# Patient Record
Sex: MALE | Race: WHITE | URBAN - METROPOLITAN AREA
[De-identification: names, ages, dates, MRNs, and addresses within clinical notes are randomized per-mention and may not be internally consistent; named-entity substitution may affect disease eponyms.]

---

## 2017-10-10 NOTE — PROGRESS NOTES
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 Tx: knee pad.     PFSH:  Past Medical, social, family histories, medications, and allergies were reviewed and updated.     OBJECTIVE:  There were no vitals taken for this visit.  General:  Rojelio is awake, alert, and cooperative.  NAD.  Knee Exam: Inspection: patellar swelling and tenderness  Tender: prepatellar bursa  Non-tender: lateral joint line, medial joint line  Active Range of Motion: full flexion, no pain with flexion, full extension, no pain with extension  Strength: quad  5/5  Special tests: normal Valgus stress test, normal Varus, negative Lachman's test, negative Diana's     Also examined: hip full range of motion     X-ray right knee: neg. pending radiology     ASSESSMENT:     ICD-10-CM    1. Right knee pain, unspecified chronicity M25.561 XR Knee Right 3 Views     SPORTS MEDICINE REFERRAL   2. Patellar bursitis of right knee M70.51 XR Knee Right 3 Views     SPORTS MEDICINE REFERRAL       PLAN:   ice or cold packs 20 minutes every 2-3 hrs as needed to relieve pain and swelling, for the first 2 days. Then can apply heat 20 minutes every 2-3 hrs (avoid sleeping on heating pad) there after as needed.   If you can tolerate, start non-steroidal anti-inflammatory medication like: Ibuprofen 600-800 mg three times daily or Aleve 2 tablets of over the counter strength twice a day as needed.   Tylenol can help with pain also.    Active range of  motion exercises encouraged  Activity modification trying to avoid activities that cause you pain.   See Patient Instructions   Follow up 4 wks with sports med as needed     Skip Joseph PA-C

## 2017-10-11 ENCOUNTER — RADIANT APPOINTMENT (OUTPATIENT)
Dept: GENERAL RADIOLOGY | Facility: CLINIC | Age: 32
End: 2017-10-11
Attending: PHYSICIAN ASSISTANT
Payer: COMMERCIAL

## 2017-10-11 ENCOUNTER — OFFICE VISIT (OUTPATIENT)
Dept: FAMILY MEDICINE | Facility: CLINIC | Age: 32
End: 2017-10-11
Payer: COMMERCIAL

## 2017-10-11 VITALS
BODY MASS INDEX: 20.39 KG/M2 | HEART RATE: 63 BPM | DIASTOLIC BLOOD PRESSURE: 63 MMHG | WEIGHT: 158.8 LBS | SYSTOLIC BLOOD PRESSURE: 102 MMHG | TEMPERATURE: 97.4 F

## 2017-10-11 DIAGNOSIS — M25.561 RIGHT KNEE PAIN, UNSPECIFIED CHRONICITY: Primary | ICD-10-CM

## 2017-10-11 DIAGNOSIS — M70.51 PATELLAR BURSITIS OF RIGHT KNEE: ICD-10-CM

## 2017-10-11 PROCEDURE — 99213 OFFICE O/P EST LOW 20 MIN: CPT | Performed by: PHYSICIAN ASSISTANT

## 2017-10-11 PROCEDURE — 73562 X-RAY EXAM OF KNEE 3: CPT | Mod: RT

## 2017-10-11 NOTE — NURSING NOTE
"Chief Complaint   Patient presents with     Knee Pain       Initial /63  Pulse 63  Temp 97.4  F (36.3  C) (Oral)  Wt 158 lb 12.8 oz (72 kg)  BMI 20.39 kg/m2 Estimated body mass index is 20.39 kg/(m^2) as calculated from the following:    Height as of 8/4/16: 6' 2\" (1.88 m).    Weight as of this encounter: 158 lb 12.8 oz (72 kg).  Medication Reconciliation: complete  Ej Ribeiro CMA    "

## 2017-10-11 NOTE — MR AVS SNAPSHOT
After Visit Summary   10/11/2017    Rojelio Russell    MRN: [id number]           Patient Information     Date Of Birth          1985        Visit Information        Provider Department      10/11/2017 8:40 AM Skip Joseph PA-C [facility]        Today's Diagnoses     Right knee pain, unspecified chronicity    -  1    Patellar bursitis of right knee          Care Instructions                  Prepatellar Bursitis             What is prepatellar bursitis?   Prepatellar bursitis is an irritation or inflammation of a bursa in your knee. A bursa is a fluid-filled sac that surrounds joints or tendons. A bursa reduces friction by cushioning muscles or tendons and bones that move back and forth across each other.   There are several bursae in the knee. The prepatellar bursa is located just in front of the kneecap near the attachment of the kneecap (patellar) tendon. Prepatellar bursitis is also called 's knee because maids got it from cleaning floors on their knees. The injury is common in wrestlers, who get it from their knees rubbing on the mats. Volleyball players get it from diving onto their knees for the ball.   How does it occur?   Bursitis can result from:   overuse   a direct blow to the knee   chronic friction, such as from frequent kneeling   What are the symptoms?   Prepatellar bursitis causes pain and swelling over the front of the knee. You may have pain when you bend or straighten your leg.   How is it diagnosed?   Your healthcare provider will examine your knee for tenderness over the bursa. He or she may use a needle and syringe to get a sample of fluid from the bursa to check for infection and look for other causes of the bursitis. You may have X-rays and blood tests.   How is it treated?   To treat this condition:   Rest the joint that is hurting. Raise your knee on a pillow when you sit or lie down.   Do not put any pressure on the sore and swollen  area until the swelling subsides.   Put an ice pack, gel pack, or package of frozen vegetables, wrapped in a cloth on the area every 3 to 4 hours, for up to 20 minutes at a time.   Wear a knee sleeve or an elastic bandage around your knee to reduce any swelling or to prevent swelling from occurring.   Take an anti-inflammatory medicine such as ibuprofen, or other medicine as directed by your provider. Nonsteroidal anti-inflammatory medicines (NSAIDs) may cause stomach bleeding and other problems. These risks increase with age. Read the label and take as directed. Unless recommended by your healthcare provider, do not take for more than 10 days.   Your provider may give you an injection of a corticosteroid medicine into the swollen bursa.   Follow your provider's instructions for doing exercises to help you recover.   Your healthcare provider may need to remove some of the fluid within the bursa if it is very swollen.   How long will the effects last?   The length of recovery depends on many factors such as your age, health, and if you have had a previous injury. Recovery time also depends on the severity of the injury. The pain from prepatellar bursitis is usually gone within a few weeks although there may be painless swelling for up to several months.   When can I return to my normal activities?   Everyone recovers from an injury at a different rate. Return to your activities depends on how soon your knee recovers, not by how many days or weeks it has been since your injury has occurred. In general, the longer you have symptoms before you start treatment, the longer it will take to get better. The goal of rehabilitation is to return you to your normal activities as soon as is safely possible. If you return too soon you may worsen your injury.   You may safely return to your activities when, starting from the top of the list and progressing to the end, each of the following is true:   Your injured knee can be fully  straightened and bent without pain.   Your knee and leg have regained normal strength compared to the uninjured knee and leg.   Your knee bursa is not swollen or tender to touch.   You are able to put pressure on your bursa (such as kneeling) without pain or swelling.   You are able to walk or jog straight ahead without limping.   How can I prevent prepatellar bursitis?   Prepatellar bursitis is best prevented by avoiding direct blows to the kneecap area and by avoiding prolonged kneeling. Proper protective kneepads will help prevent inflammation of the bursa.     Published by [employer].  This content is reviewed periodically and is subject to change as new health information becomes available. The information is intended to inform and educate and is not a replacement for medical evaluation, advice, diagnosis or treatment by a healthcare professional.   Written by Casey Guerra MD, for [employer].   ? 2010 [facility] and/or its affiliates. All Rights Reserved.   Copyright   Clinical Reference Systems 2011  Adult Health Advisor    prepatellar bursitis exercises          Follow-ups after your visit        Additional Services     SPORTS MEDICINE REFERRAL       Your provider has referred you to:  [facility]: [facility]  [phone]   [email or web address]    Please be aware that coverage of these services is subject to the terms and limitations of your health insurance plan.  Call member services at your health plan with any benefit or coverage questions.      Please bring the following to your appointment:    >>   Any x-rays, CTs or MRIs which have been performed.  Contact the facility where they were done to arrange for  prior to your scheduled appointment.    >>   List of current medications   >>   This referral request   >>   Any documents/labs given to you for this referral                 "  Who to contact     If you have questions or need follow up information about today's clinic visit or your schedule please contact [facility] directly at [phone].  Normal or non-critical lab and imaging results will be communicated to you by MyChart, letter or phone within 4 business days after the clinic has received the results. If you do not hear from us within 7 days, please contact the clinic through MyChart or phone. If you have a critical or abnormal lab result, we will notify you by phone as soon as possible.  Submit refill requests through [employer] or call your pharmacy and they will forward the refill request to us. Please allow 3 business days for your refill to be completed.          Additional Information About Your Visit        Live Shuttle[employer]rocket Information     PayOrPass lets you send messages to your doctor, view your test results, renew your prescriptions, schedule appointments and more. To sign up, go to [email or web address] . Click on \"Log in\" on the left side of the screen, which will take you to the Welcome page. Then click on \"Sign up Now\" on the right side of the page.     You will be asked to enter the access code listed below, as well as some personal information. Please follow the directions to create your username and password.     Your access code is: [email or web address]  Expires: 10/22/2017  3:17 PM     Your access code will  in 90 days. If you need help or a new code, please call your [facility] clinic or [phone].        Care EveryWhere ID     This is your Care EveryWhere ID. This could be used by other organizations to access your [facility] medical records  [id number]        Your Vitals Were     Pulse Temperature BMI (Body Mass Index)             63 97.4  F (36.3  C) (Oral) 20.39 kg/m2          Blood Pressure from Last 3 Encounters:   10/11/17 102/63   16 108/64   10/21/14 90/62    Weight from Last 3 Encounters:   10/11/17 158 lb 12.8 oz (72 kg)   16 157 lb " (71.2 kg)   08/03/16 157 lb 2 oz (71.3 kg)              We Performed the Following     SPORTS MEDICINE REFERRAL     XR Knee Right 3 Views        Primary Care Provider Office Phone # Fax #    [facility] [phone] [address] [facility] [address]        Equal Access to Services     MATTHEW JUSTIN : Hadii aad ku hadasho Soomaali, waaxda luqadaha, qaybta kaalmada adeegyada, waxay maikolin hayaan adebailey kharaanny ladeisi . So [facility] [phone].    ATENCIÓN: Si habla español, tiene a hernandez disposición servicios gratuitos de asistencia lingüística. Llame al [phone].    We comply with applicable federal civil rights laws and [place] laws. We do not discriminate on the basis of race, color, national origin, age, disability, sex, sexual orientation, or gender identity.            Thank you!     Thank you for choosing [facility]  for your care. Our goal is always to provide you with excellent care. Hearing back from our patients is one way we can continue to improve our services. Please take a few minutes to complete the written survey that you may receive in the mail after your visit with us. Thank you!             Your Updated Medication List - Protect others around you: Learn how to safely use, store and throw away your medicines at www.disposemymeds.org.      Notice  As of 10/11/2017  9:25 AM    You have not been prescribed any medications.

## 2017-10-11 NOTE — PATIENT INSTRUCTIONS
Prepatellar Bursitis             What is prepatellar bursitis?   Prepatellar bursitis is an irritation or inflammation of a bursa in your knee. A bursa is a fluid-filled sac that surrounds joints or tendons. A bursa reduces friction by cushioning muscles or tendons and bones that move back and forth across each other.   There are several bursae in the knee. The prepatellar bursa is located just in front of the kneecap near the attachment of the kneecap (patellar) tendon. Prepatellar bursitis is also called 's knee because maids got it from cleaning floors on their knees. The injury is common in wrestlers, who get it from their knees rubbing on the mats. Volleyball players get it from diving onto their knees for the ball.   How does it occur?   Bursitis can result from:   overuse   a direct blow to the knee   chronic friction, such as from frequent kneeling   What are the symptoms?   Prepatellar bursitis causes pain and swelling over the front of the knee. You may have pain when you bend or straighten your leg.   How is it diagnosed?   Your healthcare provider will examine your knee for tenderness over the bursa. He or she may use a needle and syringe to get a sample of fluid from the bursa to check for infection and look for other causes of the bursitis. You may have X-rays and blood tests.   How is it treated?   To treat this condition:   Rest the joint that is hurting. Raise your knee on a pillow when you sit or lie down.   Do not put any pressure on the sore and swollen area until the swelling subsides.   Put an ice pack, gel pack, or package of frozen vegetables, wrapped in a cloth on the area every 3 to 4 hours, for up to 20 minutes at a time.   Wear a knee sleeve or an elastic bandage around your knee to reduce any swelling or to prevent swelling from occurring.   Take an anti-inflammatory medicine such as ibuprofen, or other medicine as directed by your provider. Nonsteroidal  anti-inflammatory medicines (NSAIDs) may cause stomach bleeding and other problems. These risks increase with age. Read the label and take as directed. Unless recommended by your healthcare provider, do not take for more than 10 days.   Your provider may give you an injection of a corticosteroid medicine into the swollen bursa.   Follow your provider's instructions for doing exercises to help you recover.   Your healthcare provider may need to remove some of the fluid within the bursa if it is very swollen.   How long will the effects last?   The length of recovery depends on many factors such as your age, health, and if you have had a previous injury. Recovery time also depends on the severity of the injury. The pain from prepatellar bursitis is usually gone within a few weeks although there may be painless swelling for up to several months.   When can I return to my normal activities?   Everyone recovers from an injury at a different rate. Return to your activities depends on how soon your knee recovers, not by how many days or weeks it has been since your injury has occurred. In general, the longer you have symptoms before you start treatment, the longer it will take to get better. The goal of rehabilitation is to return you to your normal activities as soon as is safely possible. If you return too soon you may worsen your injury.   You may safely return to your activities when, starting from the top of the list and progressing to the end, each of the following is true:   Your injured knee can be fully straightened and bent without pain.   Your knee and leg have regained normal strength compared to the uninjured knee and leg.   Your knee bursa is not swollen or tender to touch.   You are able to put pressure on your bursa (such as kneeling) without pain or swelling.   You are able to walk or jog straight ahead without limping.   How can I prevent prepatellar bursitis?   Prepatellar bursitis is best prevented by  avoiding direct blows to the kneecap area and by avoiding prolonged kneeling. Proper protective kneepads will help prevent inflammation of the bursa.     Published by [employer].  This content is reviewed periodically and is subject to change as new health information becomes available. The information is intended to inform and educate and is not a replacement for medical evaluation, advice, diagnosis or treatment by a healthcare professional.   Written by Casey Guerra MD, for [employer].   ? 2010 [employer] and/or its affiliates. All Rights Reserved.   Copyright   Clinical Reference Systems 2011  Adult Health Advisor    prepatellar bursitis exercises

## 2018-05-30 ENCOUNTER — OFFICE VISIT (OUTPATIENT)
Dept: FAMILY MEDICINE | Facility: CLINIC | Age: 33
End: 2018-05-30
Payer: COMMERCIAL

## 2018-05-30 ENCOUNTER — PATIENT OUTREACH (OUTPATIENT)
Dept: CARE COORDINATION | Facility: CLINIC | Age: 33
End: 2018-05-30

## 2018-05-30 ENCOUNTER — RADIANT APPOINTMENT (OUTPATIENT)
Dept: GENERAL RADIOLOGY | Facility: CLINIC | Age: 33
End: 2018-05-30
Attending: FAMILY MEDICINE
Payer: COMMERCIAL

## 2018-05-30 VITALS
OXYGEN SATURATION: 99 % | DIASTOLIC BLOOD PRESSURE: 66 MMHG | HEART RATE: 61 BPM | BODY MASS INDEX: 20.8 KG/M2 | SYSTOLIC BLOOD PRESSURE: 103 MMHG | TEMPERATURE: 97.2 F | WEIGHT: 162 LBS

## 2018-05-30 DIAGNOSIS — R07.9 CHEST PAIN, UNSPECIFIED TYPE: ICD-10-CM

## 2018-05-30 DIAGNOSIS — R07.9 CHEST PAIN, UNSPECIFIED TYPE: Primary | ICD-10-CM

## 2018-05-30 PROCEDURE — 99213 OFFICE O/P EST LOW 20 MIN: CPT | Performed by: FAMILY MEDICINE

## 2018-05-30 PROCEDURE — 71046 X-RAY EXAM CHEST 2 VIEWS: CPT | Mod: FY

## 2018-05-30 PROCEDURE — 93000 ELECTROCARDIOGRAM COMPLETE: CPT | Performed by: FAMILY MEDICINE

## 2018-05-30 NOTE — PROGRESS NOTES
Pain in chest on and off x 6 months. No known injury.  Rebecca Read MA      HISTORY    This patient is describing intermittent somewhat sharp pain in lower left anterior chest which has occurred over the last 6 months.  He describes it as a poking sensation.  It is not severe and does not necessarily stop him from doing any activity.  Duration of pain is from a few seconds up to 30 seconds.  It is not related to exertion but is sometimes brought on by taking a deep breath or by coughing.    He is on no medications.  He is a non-smoker.    Patient Active Problem List   Diagnosis     CARDIOVASCULAR SCREENING; LDL GOAL LESS THAN 160     Chronic cough     Splinter of finger without major open wound or infection, initial encounter     No current outpatient prescriptions on file.       REVIEW OF SYSTEMS    No fever.  No cough or S OB.  No exertional chest pain.  No edema.  No nausea or abdominal pain.  No vomiting.      SOCIAL HISTORY    He works doing construction work and restoration.      No past medical history on file.      EXAM  /66  Pulse 61  Temp 97.2  F (36.2  C)  Wt 162 lb (73.5 kg)  SpO2 99%  BMI 20.8 kg/m2    Thin well-appearing man.  NAD.  HEENT unremarkable.  Neck has no thyromegaly or masses.  Chest wall is nontender.  Cardiac rhythm regular without murmurs or rubs.  Abdomen negative.  Gait normal.         EKG Interpretation:      Interpreted by Nabil Erazo  Time reviewed:10:30   Symptoms at time of EKG: intermittent CP   Rhythm: normal sinus   Rate: normal. 53.  Axis: NORMAL  Ectopy: none  Conduction: normal  ST Segments/ T Waves: No ST-T wave changes  Q Waves: none  Comparison to prior: No old EKG available    Clinical Impression: normal EKG        (R07.9) Chest pain, unspecified type  (primary encounter diagnosis)  Comment:     This sounds like brief spells of somewhat pleuritic chest pain.  Symptoms vary with respiration.  There is self-limited.  EKG and chest x-ray  unremarkable.  Patient was reassured that this does not seem serious.  I did not recommend any change in level of activity.  He is to have follow-up with these symptoms become more frequent or worsening.      Plan: EKG 12-lead complete w/read - Clinics, XR Chest        2 Views

## 2018-05-30 NOTE — PROGRESS NOTES
Clinic Care Coordination Contact  Care Team Conversations    Patient was unable to make it to his 7:30 appointment so came into clinic and saw another provider at 9:00.  Patient had no concerns about getting to clinic.    Care Team to follow set protocol for PCP/Clinic discharge if still feel it's warranted.     No other follow up at this time.     Giacomo Baxter RN  Clinic Care Coordinator  [phone] or [phone]

## 2018-05-30 NOTE — MR AVS SNAPSHOT
"              After Visit Summary   2018    Rojelio Russell    MRN: [id number]           Patient Information     Date Of Birth          1985        Visit Information        Provider Department      2018 9:20 AM Nabil Erazo MD [facility]        Today's Diagnoses     Chest pain, unspecified type    -  1       Follow-ups after your visit        Who to contact     If you have questions or need follow up information about today's clinic visit or your schedule please contact [facility] directly at [phone].  Normal or non-critical lab and imaging results will be communicated to you by [employer], letter or phone within 4 business days after the clinic has received the results. If you do not hear from us within 7 days, please contact the clinic through [employer] or phone. If you have a critical or abnormal lab result, we will notify you by phone as soon as possible.  Submit refill requests through [employer] or call your pharmacy and they will forward the refill request to us. Please allow 3 business days for your refill to be completed.          Additional Information About Your Visit        MyChart Information     [employer] lets you send messages to your doctor, view your test results, renew your prescriptions, schedule appointments and more. To sign up, go to [email or web address] . Click on \"Log in\" on the left side of the screen, which will take you to the Welcome page. Then click on \"Sign up Now\" on the right side of the page.     You will be asked to enter the access code listed below, as well as some personal information. Please follow the directions to create your username and password.     Your access code is: [email or web address]  Expires: 2018 11:39 AM     Your access code will  in 90 days. If you need help or a new code, please call your [facility] or [phone].        Care EveryWhere ID     This is your Care EveryWhere ID. This could be used by other " organizations to access your [facility] medical records  [id number]        Your Vitals Were     Pulse Temperature Pulse Oximetry BMI (Body Mass Index)          61 97.2  F (36.2  C) 99% 20.8 kg/m2         Blood Pressure from Last 3 Encounters:   05/30/18 103/66   10/11/17 102/63   08/03/16 108/64    Weight from Last 3 Encounters:   05/30/18 162 lb (73.5 kg)   10/11/17 158 lb 12.8 oz (72 kg)   08/04/16 157 lb (71.2 kg)              We Performed the Following     EKG 12-lead complete w/read - Clinics        Primary Care Provider Office Phone # Fax #    [facility] [phone] [address] [facility] [address]        Equal Access to Services     MATTHEW JUSTIN : Hadii diandra cho Solety, waaxda luqadaha, qaybta kaalmada adeegyamat, renée moreno . So [facility] [phone].    ATENCIÓN: Si habla español, tiene a hernandez disposición servicios gratuitos de asistencia lingüística. Jessie al [phone].    We comply with applicable federal civil rights laws and [place] laws. We do not discriminate on the basis of race, color, national origin, age, disability, sex, sexual orientation, or gender identity.            Thank you!     Thank you for choosing [facility]  for your care. Our goal is always to provide you with excellent care. Hearing back from our patients is one way we can continue to improve our services. Please take a few minutes to complete the written survey that you may receive in the mail after your visit with us. Thank you!             Your Updated Medication List - Protect others around you: Learn how to safely use, store and throw away your medicines at www.disposemymeds.org.      Notice  As of 5/30/2018 11:36 AM    You have not been prescribed any medications.